# Patient Record
Sex: FEMALE | Race: WHITE | URBAN - METROPOLITAN AREA
[De-identification: names, ages, dates, MRNs, and addresses within clinical notes are randomized per-mention and may not be internally consistent; named-entity substitution may affect disease eponyms.]

---

## 2024-08-09 ENCOUNTER — EMPLOYEE HEALTH (OUTPATIENT)
Dept: OTHER | Facility: HOSPITAL | Age: 34
End: 2024-08-09
Attending: PREVENTIVE MEDICINE

## 2024-08-09 DIAGNOSIS — Z01.84 IMMUNITY STATUS TESTING: ICD-10-CM

## 2024-08-09 DIAGNOSIS — Z11.1 SCREENING-PULMONARY TB: Primary | ICD-10-CM

## 2024-08-09 PROCEDURE — 86787 VARICELLA-ZOSTER ANTIBODY: CPT

## 2024-08-09 PROCEDURE — 86480 TB TEST CELL IMMUN MEASURE: CPT

## 2024-08-12 LAB — VZV IGG SER IA-ACNC: 131.2 (ref 165–?)

## 2024-08-16 ENCOUNTER — LAB ENCOUNTER (OUTPATIENT)
Dept: LAB | Age: 34
End: 2024-08-16
Attending: PREVENTIVE MEDICINE

## 2025-01-11 ENCOUNTER — LAB ENCOUNTER (OUTPATIENT)
Dept: LAB | Age: 35
End: 2025-01-11
Attending: PREVENTIVE MEDICINE
Payer: COMMERCIAL

## 2025-01-11 DIAGNOSIS — Z11.1 SCREENING-PULMONARY TB: ICD-10-CM

## 2025-01-11 PROCEDURE — 86480 TB TEST CELL IMMUN MEASURE: CPT

## 2025-01-11 PROCEDURE — 36415 COLL VENOUS BLD VENIPUNCTURE: CPT

## 2025-01-14 LAB
M TB IFN-G CD4+ T-CELLS BLD-ACNC: 0.02 IU/ML
M TB TUBERC IFN-G BLD QL: NEGATIVE
M TB TUBERC IGNF/MITOGEN IGNF CONTROL: 5.47 IU/ML
QFT TB1 AG MINUS NIL: 0.01 IU/ML
QFT TB2 AG MINUS NIL: 0 IU/ML

## 2025-03-15 ENCOUNTER — OFFICE VISIT (OUTPATIENT)
Dept: FAMILY MEDICINE CLINIC | Facility: CLINIC | Age: 35
End: 2025-03-15
Payer: COMMERCIAL

## 2025-03-15 ENCOUNTER — LAB ENCOUNTER (OUTPATIENT)
Dept: LAB | Age: 35
End: 2025-03-15
Attending: STUDENT IN AN ORGANIZED HEALTH CARE EDUCATION/TRAINING PROGRAM
Payer: COMMERCIAL

## 2025-03-15 VITALS
BODY MASS INDEX: 26.53 KG/M2 | WEIGHT: 169 LBS | SYSTOLIC BLOOD PRESSURE: 110 MMHG | TEMPERATURE: 97 F | HEART RATE: 70 BPM | RESPIRATION RATE: 16 BRPM | HEIGHT: 67 IN | OXYGEN SATURATION: 99 % | DIASTOLIC BLOOD PRESSURE: 60 MMHG

## 2025-03-15 DIAGNOSIS — Z13.1 DIABETES MELLITUS SCREENING: ICD-10-CM

## 2025-03-15 DIAGNOSIS — Z13.220 SCREENING CHOLESTEROL LEVEL: ICD-10-CM

## 2025-03-15 DIAGNOSIS — Z86.32 HISTORY OF GESTATIONAL DIABETES: ICD-10-CM

## 2025-03-15 DIAGNOSIS — Z12.39 BREAST CANCER SCREENING OTHER THAN MAMMOGRAM: ICD-10-CM

## 2025-03-15 DIAGNOSIS — Z00.00 WELL ADULT EXAM: ICD-10-CM

## 2025-03-15 DIAGNOSIS — Z13.31 NEGATIVE DEPRESSION SCREENING: ICD-10-CM

## 2025-03-15 DIAGNOSIS — Z80.3 FAMILY HISTORY OF BREAST CANCER: ICD-10-CM

## 2025-03-15 DIAGNOSIS — Z12.31 BREAST CANCER SCREENING BY MAMMOGRAM: ICD-10-CM

## 2025-03-15 DIAGNOSIS — Z00.00 WELL ADULT EXAM: Primary | ICD-10-CM

## 2025-03-15 LAB
ALBUMIN SERPL-MCNC: 4.6 G/DL (ref 3.2–4.8)
ALBUMIN/GLOB SERPL: 1.5 {RATIO} (ref 1–2)
ALP LIVER SERPL-CCNC: 87 U/L
ALT SERPL-CCNC: 8 U/L
ANION GAP SERPL CALC-SCNC: 7 MMOL/L (ref 0–18)
AST SERPL-CCNC: 20 U/L (ref ?–34)
BASOPHILS # BLD AUTO: 0.06 X10(3) UL (ref 0–0.2)
BASOPHILS NFR BLD AUTO: 1.2 %
BILIRUB SERPL-MCNC: 0.4 MG/DL (ref 0.3–1.2)
BUN BLD-MCNC: 13 MG/DL (ref 9–23)
CALCIUM BLD-MCNC: 9.3 MG/DL (ref 8.7–10.6)
CHLORIDE SERPL-SCNC: 106 MMOL/L (ref 98–112)
CHOLEST SERPL-MCNC: 166 MG/DL (ref ?–200)
CO2 SERPL-SCNC: 27 MMOL/L (ref 21–32)
CREAT BLD-MCNC: 0.69 MG/DL
EGFRCR SERPLBLD CKD-EPI 2021: 115 ML/MIN/1.73M2 (ref 60–?)
EOSINOPHIL # BLD AUTO: 0.13 X10(3) UL (ref 0–0.7)
EOSINOPHIL NFR BLD AUTO: 2.6 %
ERYTHROCYTE [DISTWIDTH] IN BLOOD BY AUTOMATED COUNT: 12.9 %
EST. AVERAGE GLUCOSE BLD GHB EST-MCNC: 123 MG/DL (ref 68–126)
FASTING PATIENT LIPID ANSWER: YES
FASTING STATUS PATIENT QL REPORTED: YES
GLOBULIN PLAS-MCNC: 3.1 G/DL (ref 2–3.5)
GLUCOSE BLD-MCNC: 81 MG/DL (ref 70–99)
HBA1C MFR BLD: 5.9 % (ref ?–5.7)
HCT VFR BLD AUTO: 39.9 %
HDLC SERPL-MCNC: 56 MG/DL (ref 40–59)
HGB BLD-MCNC: 12.8 G/DL
IMM GRANULOCYTES # BLD AUTO: 0.01 X10(3) UL (ref 0–1)
IMM GRANULOCYTES NFR BLD: 0.2 %
LDLC SERPL CALC-MCNC: 96 MG/DL (ref ?–100)
LYMPHOCYTES # BLD AUTO: 1.51 X10(3) UL (ref 1–4)
LYMPHOCYTES NFR BLD AUTO: 30.2 %
MCH RBC QN AUTO: 28.4 PG (ref 26–34)
MCHC RBC AUTO-ENTMCNC: 32.1 G/DL (ref 31–37)
MCV RBC AUTO: 88.7 FL
MONOCYTES # BLD AUTO: 0.32 X10(3) UL (ref 0.1–1)
MONOCYTES NFR BLD AUTO: 6.4 %
NEUTROPHILS # BLD AUTO: 2.97 X10 (3) UL (ref 1.5–7.7)
NEUTROPHILS # BLD AUTO: 2.97 X10(3) UL (ref 1.5–7.7)
NEUTROPHILS NFR BLD AUTO: 59.4 %
NONHDLC SERPL-MCNC: 110 MG/DL (ref ?–130)
OSMOLALITY SERPL CALC.SUM OF ELEC: 289 MOSM/KG (ref 275–295)
PLATELET # BLD AUTO: 235 10(3)UL (ref 150–450)
POTASSIUM SERPL-SCNC: 4.6 MMOL/L (ref 3.5–5.1)
PROT SERPL-MCNC: 7.7 G/DL (ref 5.7–8.2)
RBC # BLD AUTO: 4.5 X10(6)UL
SODIUM SERPL-SCNC: 140 MMOL/L (ref 136–145)
TRIGL SERPL-MCNC: 71 MG/DL (ref 30–149)
TSI SER-ACNC: 0.85 UIU/ML (ref 0.55–4.78)
VLDLC SERPL CALC-MCNC: 12 MG/DL (ref 0–30)
WBC # BLD AUTO: 5 X10(3) UL (ref 4–11)

## 2025-03-15 PROCEDURE — 80061 LIPID PANEL: CPT

## 2025-03-15 PROCEDURE — 85025 COMPLETE CBC W/AUTO DIFF WBC: CPT

## 2025-03-15 PROCEDURE — 84443 ASSAY THYROID STIM HORMONE: CPT

## 2025-03-15 PROCEDURE — 36415 COLL VENOUS BLD VENIPUNCTURE: CPT

## 2025-03-15 PROCEDURE — 99385 PREV VISIT NEW AGE 18-39: CPT | Performed by: STUDENT IN AN ORGANIZED HEALTH CARE EDUCATION/TRAINING PROGRAM

## 2025-03-15 PROCEDURE — 80053 COMPREHEN METABOLIC PANEL: CPT

## 2025-03-15 PROCEDURE — 83036 HEMOGLOBIN GLYCOSYLATED A1C: CPT

## 2025-03-15 NOTE — PROGRESS NOTES
Subjective:      Chief Complaint   Patient presents with    Physical     Last pap done 2023 with Dr. Shetty in [facility].     HISTORY OF PRESENT ILLNESS  HPI  HPI obtained per patient report.  Anna West is a pleasant 35 year old female presenting for her annual physical.   She is overall feeling well.   She requests early breast cancer screening, as her mother and aunt have a history of breast cancer, and her mother was diagnosed at the young age of 38.     PAST PATIENT HISTORY  Past Medical History:    Gestational diabetes (HCC)     Past Surgical History:   Procedure Laterality Date          Cholecystectomy      Other surgical history Right     ankle suegery x2       CURRENT MEDICATIONS  Medications Taking[1]    HEALTH MAINTENANCE  Immunization History   Administered Date(s) Administered    Covid-19 Vaccine Pfizer 30 mcg/0.3 ml 2021, 2021    DTAP 1989, 1989, 1989, 1990, 1994    FLU VAC QIV SPLIT 3 YRS AND OLDER (38789) 12/10/2018, 2021    HEP B, Ped/Adol 1994, 1999, 1999    HIB (PRP-D) 1990    IPV 1989, 1989, 1990, 1994    Influenza 2019, 2020    Influenza Vaccine, trivalent (IIV3), PF 0.5mL (34814) 10/16/2024    MMR 1994, 1994    TDAP 2016    Td, Preserv Free 2003    Varicella 2024       ALLERGIES AND DRUG REACTIONS  Allergies[2]    Family History   Problem Relation Age of Onset    Anemia Mother     Asthma Mother     Bleeding Disorders Mother     Cancer Mother     Heart Disorder Mother     Hypertension Mother     Pulmonary Disease Mother     Musculo-skelatal Disorder Father         Has MS    Stroke Maternal Grandfather     Cancer Paternal Grandmother     Diabetes Paternal Grandmother     Heart Disorder Paternal Grandmother     Pulmonary Disease Paternal Grandmother      Social History     Socioeconomic History    Marital status:    Tobacco  Use    Smoking status: Never    Smokeless tobacco: Never   Vaping Use    Vaping status: Never Used   Substance and Sexual Activity    Alcohol use: Never    Drug use: Never   Other Topics Concern    Caffeine Concern No    Exercise Yes     Comment: Walking/running 2 x a week    Seat Belt Yes     Comment: Every time I get into a car    Special Diet No    Stress Concern No    Weight Concern No       Review of Systems   All other systems reviewed and are negative.         Objective:      /60   Pulse 70   Temp 97.4 °F (36.3 °C) (Temporal)   Resp 16   Ht 5' 7\" (1.702 m)   Wt 169 lb (76.7 kg)   LMP 02/19/2025 (Exact Date)   SpO2 99%   BMI 26.47 kg/m²   Body mass index is 26.47 kg/m².    Physical Exam  Vitals reviewed.   Constitutional:       General: She is not in acute distress.     Appearance: She is not ill-appearing, toxic-appearing or diaphoretic.   HENT:      Head: Normocephalic and atraumatic.   Eyes:      General: No scleral icterus.        Right eye: No discharge.         Left eye: No discharge.      Extraocular Movements: Extraocular movements intact.      Conjunctiva/sclera: Conjunctivae normal.   Neck:      Thyroid: No thyroid mass, thyromegaly or thyroid tenderness.   Cardiovascular:      Rate and Rhythm: Normal rate and regular rhythm.      Heart sounds: Normal heart sounds.   Pulmonary:      Effort: Pulmonary effort is normal.      Breath sounds: Normal breath sounds.   Abdominal:      General: Abdomen is flat. Bowel sounds are normal. There is no distension.      Palpations: Abdomen is soft. There is no mass.      Tenderness: There is no abdominal tenderness. There is no guarding or rebound.      Hernia: No hernia is present.   Musculoskeletal:      Cervical back: Neck supple. No tenderness.      Right lower leg: No edema.      Left lower leg: No edema.   Lymphadenopathy:      Cervical: No cervical adenopathy.   Neurological:      Mental Status: She is alert and oriented to person, place, and  time.   Psychiatric:         Mood and Affect: Mood normal.            Assessment and Plan:      1. Well adult exam (Primary)  -     CBC With Differential With Platelet; Future; Expected date: 03/15/2025  -     Comp Metabolic Panel (14); Future; Expected date: 03/15/2025  -     Hemoglobin A1C; Future; Expected date: 03/15/2025  -     Lipid Panel; Future; Expected date: 03/15/2025  -     TSH W Reflex To Free T4; Future; Expected date: 03/15/2025  2. Screening cholesterol level  -     Lipid Panel; Future; Expected date: 03/15/2025  3. Diabetes mellitus screening  -     Hemoglobin A1C; Future; Expected date: 03/15/2025  4. History of gestational diabetes  -     Hemoglobin A1C; Future; Expected date: 03/15/2025  5. Family history of breast cancer  -     JOHN JOSE DANIEL 2D+3D SCREENING BILAT (CPT=77067/13992); Future; Expected date: 03/15/2025  -     US BREAST BILATERAL COMPLETE (CPT=76641-50); Future; Expected date: 03/15/2025  6. Breast cancer screening by mammogram  -     JOHN JOSE DANIEL 2D+3D SCREENING BILAT (CPT=77067/38391); Future; Expected date: 03/15/2025  -     US BREAST BILATERAL COMPLETE (CPT=76641-50); Future; Expected date: 03/15/2025  7. Breast cancer screening other than mammogram  -     JOHN JOSE DANIEL 2D+3D SCREENING BILAT (CPT=77067/73633); Future; Expected date: 03/15/2025  -     US BREAST BILATERAL COMPLETE (CPT=76641-50); Future; Expected date: 03/15/2025  8. Negative depression screening    Return in about 1 year (around 3/15/2026) for physical.    - annual lab orders discussed and provided for her today  - will order screening mammogram and breast US for due to family history of early-onset breast cancer   - she is UTD on her immunizations  - will request pap smear records from her Gyne's office     Patient verbalized understanding of assessment and recommendations. All questions and concerns were addressed.    Electronically signed by Luan Jimenez MD         [1]   No outpatient medications have been marked as taking for  the 3/15/25 encounter (Office Visit) with Luan Jimenez MD.   [2]   Allergies  Allergen Reactions    Acetaminophen HIVES     acetaminophen

## 2025-03-20 NOTE — PROGRESS NOTES
Fredi Castillo,     Your lab results showed mildly elevated blood sugars but otherwise looked great. Please try to cut back on your intake of carbohydrates and exercise at least 20-30  minutes 5 days per week. I would recommend annual laboratory monitoring at this time.     Have a nice day,  Dr. Jimenez

## 2025-04-16 ENCOUNTER — HOSPITAL ENCOUNTER (OUTPATIENT)
Dept: MAMMOGRAPHY | Age: 35
Discharge: HOME | End: 2025-04-16
Attending: STUDENT IN AN ORGANIZED HEALTH CARE EDUCATION/TRAINING PROGRAM
Payer: COMMERCIAL

## 2025-04-16 DIAGNOSIS — Z80.3 FAMILY HISTORY OF BREAST CANCER: ICD-10-CM

## 2025-04-16 DIAGNOSIS — Z12.31 BREAST CANCER SCREENING BY MAMMOGRAM: ICD-10-CM

## 2025-04-16 DIAGNOSIS — Z12.39 BREAST CANCER SCREENING OTHER THAN MAMMOGRAM: ICD-10-CM

## 2025-04-16 PROCEDURE — 77067 SCR MAMMO BI INCL CAD: CPT | Performed by: STUDENT IN AN ORGANIZED HEALTH CARE EDUCATION/TRAINING PROGRAM

## 2025-04-16 PROCEDURE — 77063 BREAST TOMOSYNTHESIS BI: CPT | Performed by: STUDENT IN AN ORGANIZED HEALTH CARE EDUCATION/TRAINING PROGRAM

## 2025-04-17 NOTE — PROGRESS NOTES
Fredi Castillo,     Good news - your mammogram result was benign. We can reconsider another screening mammogram in 1 year. Given your family history, we can also consider genetic testing if your mom did not have BRCA testing. Please let me know if you would like to proceed with this and we could refer you to a genetic counselor. Please let me know if you have any questions.     Dr. Jimenez

## 2025-07-16 ENCOUNTER — HOSPITAL ENCOUNTER (EMERGENCY)
Age: 35
Discharge: HOME | End: 2025-07-16
Attending: EMERGENCY MEDICINE
Payer: COMMERCIAL

## 2025-07-16 ENCOUNTER — APPOINTMENT (OUTPATIENT)
Dept: GENERAL RADIOLOGY | Age: 35
End: 2025-07-16
Attending: EMERGENCY MEDICINE
Payer: COMMERCIAL

## 2025-07-16 ENCOUNTER — APPOINTMENT (OUTPATIENT)
Dept: CT IMAGING | Age: 35
End: 2025-07-16
Attending: EMERGENCY MEDICINE
Payer: COMMERCIAL

## 2025-07-16 ENCOUNTER — TELEPHONE (OUTPATIENT)
Dept: FAMILY MEDICINE CLINIC | Facility: CLINIC | Age: 35
End: 2025-07-16

## 2025-07-16 VITALS
WEIGHT: 167 LBS | RESPIRATION RATE: 18 BRPM | HEART RATE: 86 BPM | SYSTOLIC BLOOD PRESSURE: 106 MMHG | DIASTOLIC BLOOD PRESSURE: 77 MMHG | HEIGHT: 67 IN | TEMPERATURE: 99 F | BODY MASS INDEX: 26.21 KG/M2 | OXYGEN SATURATION: 99 %

## 2025-07-16 DIAGNOSIS — S16.1XXA STRAIN OF NECK MUSCLE, INITIAL ENCOUNTER: Primary | ICD-10-CM

## 2025-07-16 DIAGNOSIS — G56.92 NEUROPATHY, UPPER EXTREMITY, LEFT: ICD-10-CM

## 2025-07-16 LAB
ALBUMIN SERPL-MCNC: 4.7 G/DL (ref 3.2–4.8)
ALBUMIN/GLOB SERPL: 1.6 {RATIO} (ref 1–2)
ALP LIVER SERPL-CCNC: 88 U/L (ref 37–98)
ALT SERPL-CCNC: 14 U/L (ref 10–49)
ANION GAP SERPL CALC-SCNC: 6 MMOL/L (ref 0–18)
AST SERPL-CCNC: 23 U/L (ref ?–34)
B-HCG UR QL: NEGATIVE
BASOPHILS # BLD AUTO: 0.08 X10(3) UL (ref 0–0.2)
BASOPHILS NFR BLD AUTO: 1.3 %
BILIRUB SERPL-MCNC: 0.3 MG/DL (ref 0.3–1.2)
BUN BLD-MCNC: 12 MG/DL (ref 9–23)
CALCIUM BLD-MCNC: 9.6 MG/DL (ref 8.7–10.6)
CHLORIDE SERPL-SCNC: 105 MMOL/L (ref 98–112)
CO2 SERPL-SCNC: 28 MMOL/L (ref 21–32)
CREAT BLD-MCNC: 0.76 MG/DL (ref 0.55–1.02)
EGFRCR SERPLBLD CKD-EPI 2021: 104 ML/MIN/1.73M2 (ref 60–?)
EOSINOPHIL # BLD AUTO: 0.1 X10(3) UL (ref 0–0.7)
EOSINOPHIL NFR BLD AUTO: 1.6 %
ERYTHROCYTE [DISTWIDTH] IN BLOOD BY AUTOMATED COUNT: 13.2 %
GLOBULIN PLAS-MCNC: 3 G/DL (ref 2–3.5)
GLUCOSE BLD-MCNC: 107 MG/DL (ref 70–99)
HCT VFR BLD AUTO: 38.2 % (ref 35–48)
HGB BLD-MCNC: 12.3 G/DL (ref 12–16)
IMM GRANULOCYTES # BLD AUTO: 0.01 X10(3) UL (ref 0–1)
IMM GRANULOCYTES NFR BLD: 0.2 %
LYMPHOCYTES # BLD AUTO: 2.26 X10(3) UL (ref 1–4)
LYMPHOCYTES NFR BLD AUTO: 37 %
MCH RBC QN AUTO: 28.5 PG (ref 26–34)
MCHC RBC AUTO-ENTMCNC: 32.2 G/DL (ref 31–37)
MCV RBC AUTO: 88.4 FL (ref 80–100)
MONOCYTES # BLD AUTO: 0.53 X10(3) UL (ref 0.1–1)
MONOCYTES NFR BLD AUTO: 8.7 %
NEUTROPHILS # BLD AUTO: 3.13 X10 (3) UL (ref 1.5–7.7)
NEUTROPHILS # BLD AUTO: 3.13 X10(3) UL (ref 1.5–7.7)
NEUTROPHILS NFR BLD AUTO: 51.2 %
OSMOLALITY SERPL CALC.SUM OF ELEC: 288 MOSM/KG (ref 275–295)
PLATELET # BLD AUTO: 223 10(3)UL (ref 150–450)
POTASSIUM SERPL-SCNC: 4.2 MMOL/L (ref 3.5–5.1)
PROT SERPL-MCNC: 7.7 G/DL (ref 5.7–8.2)
RBC # BLD AUTO: 4.32 X10(6)UL (ref 3.8–5.3)
SODIUM SERPL-SCNC: 139 MMOL/L (ref 136–145)
TROPONIN I SERPL HS-MCNC: <3 NG/L (ref ?–34)
WBC # BLD AUTO: 6.1 X10(3) UL (ref 4–11)

## 2025-07-16 PROCEDURE — 85025 COMPLETE CBC W/AUTO DIFF WBC: CPT | Performed by: EMERGENCY MEDICINE

## 2025-07-16 PROCEDURE — 93010 ELECTROCARDIOGRAM REPORT: CPT

## 2025-07-16 PROCEDURE — 99285 EMERGENCY DEPT VISIT HI MDM: CPT

## 2025-07-16 PROCEDURE — 81025 URINE PREGNANCY TEST: CPT

## 2025-07-16 PROCEDURE — 72125 CT NECK SPINE W/O DYE: CPT | Performed by: EMERGENCY MEDICINE

## 2025-07-16 PROCEDURE — 84484 ASSAY OF TROPONIN QUANT: CPT | Performed by: EMERGENCY MEDICINE

## 2025-07-16 PROCEDURE — 93005 ELECTROCARDIOGRAM TRACING: CPT

## 2025-07-16 PROCEDURE — 71045 X-RAY EXAM CHEST 1 VIEW: CPT | Performed by: EMERGENCY MEDICINE

## 2025-07-16 PROCEDURE — 36415 COLL VENOUS BLD VENIPUNCTURE: CPT

## 2025-07-16 PROCEDURE — 80053 COMPREHEN METABOLIC PANEL: CPT | Performed by: EMERGENCY MEDICINE

## 2025-07-16 NOTE — DISCHARGE INSTRUCTIONS
Take 1000 mg acetaminophen (2 Tylenol tablets) and/or 600 mg ibuprofen (3 Advil tablets) every 6 hours as needed for pain/fever    Apply heating pad to the painful area of the neck

## 2025-07-16 NOTE — ED INITIAL ASSESSMENT (HPI)
Left arm and hand numbness and pressure that radiates to left chest, has progressively gotten worse since Saturday. No recent travel. No leg swelling, sob. No recent illness.

## 2025-07-16 NOTE — TELEPHONE ENCOUNTER
Future Appointments   Date Time Provider Department Center   8/6/2025  1:00 PM Luan Jimenez MD [facility] [address] [facility] [address]     Patient requesting a sooner appointment for her symptoms.

## 2025-07-16 NOTE — TELEPHONE ENCOUNTER
Spoke to patient. She woke up on Saturday with left pain and numbness in her arm and hand. Patient thought she slept in a wrong position. Denies any recent trauma. Denies any chest pain, shortness of breath, dizziness, lightheadedness, difficulty breathing. Smile is symmetrical. Patient speaking clearly and full sentences. No weakness, able to  objects.     Advised patient if symptoms worsen or she develops chest pain, shortness of breath, etc to seek immediate medical attention.     Patient is requesting a sooner appointment.     Please advise.     Future Appointments   Date Time Provider Department Center   8/6/2025  1:00 PM Luan Jimenez MD [facility] [address] [facility] [address]

## 2025-07-16 NOTE — ED PROVIDER NOTES
Patient Seen in: [facility] [place]      History     Stated Complaint: numbness down left arm and pain; radiates to back and upper left side of chest  History of Present Illness  Anna West is a 35 year old female who presents with numbness and tingling in her left arm and hand. She contacted her primary care provider earlier today, who mentioned that her symptoms could mimic a heart attack.    Numbness and tingling in the left arm and hand began on Saturday, affecting the entire hand and impairing her ability to  objects. Pain radiates from the back of her left shoulder down the arm, with shoulder pain starting today and arm pain beginning yesterday. The pain occurs spontaneously without pressure provocation. She does not take any prescribed medications but regularly takes vitamins. 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    [Note: Patient (or parent) aware that ambient AI utilized for transcribing the HPI.]  Objective:   Past Medical History:    Gestational diabetes (HCC)    Prediabetes              Past Surgical History:   Procedure Laterality Date          Cholecystectomy      Other surgical history Right     ankle suegery x2                Social History     Socioeconomic History    Marital status:    Tobacco Use    Smoking status: Never    Smokeless tobacco: Never   Vaping Use    Vaping status: Never Used   Substance and Sexual Activity    Alcohol use: Never    Drug use: Never   Other Topics Concern    Caffeine Concern No    Exercise Yes     Comment: Walking/running 2 x a week    Seat Belt Yes     Comment: Every time I get into a car    Special Diet No    Stress Concern No    Weight Concern No              Review of Systems    Positive for stated complaint: numbness down left arm and pain; radiates to back and upper left side of chest  Other systems are as noted in HPI.  Constitutional and  vital signs reviewed.      All other systems reviewed and negative except as noted above.    Physical Exam     ED Triage Vitals [07/16/25 1715]   /77   Pulse 82   Resp 20   Temp 99 °F (37.2 °C)   Temp src Oral   SpO2 100 %   O2 Device None (Room air)       Current:/77   Pulse 86   Temp 99 °F (37.2 °C) (Oral)   Resp 18   Ht 170.2 cm (5' 7\")   Wt 75.8 kg   LMP 07/13/2025 (Exact Date)   SpO2 99%   BMI 26.16 kg/m²     General:  Vitals as listed.  No acute distress   HEENT: Sclerae anicteric.  Conjunctivae show no pallor.  Oropharynx clear, mucous membranes moist  Neck: Nontender  Lungs: good air exchange  Extremities: no edema, normal peripheral pulses.  No palpable tenderness of the left shoulder.  Neuro: Alert oriented and nonfocal.  Normal radial/median/ulnar motor/sensory exam of the upper extremities      ED Course     Labs Reviewed   COMP METABOLIC PANEL (14) - Abnormal; Notable for the following components:       Result Value    Glucose 107 (*)     All other components within normal limits   TROPONIN I HIGH SENSITIVITY - Normal   POCT PREGNANCY URINE - Normal   CBC WITH DIFFERENTIAL WITH PLATELET   SCAN SLIDE   RAINBOW DRAW BLUE     CT SPINE CERVICAL (CPT=72125)  Result Date: 7/16/2025  CONCLUSION: 1.  No cervical spine pathology. Consider MRI for further evaluation, as clinically directed. 2.  Partial calcification of both stylohyoid ligaments. Possibility of Eagle syndrome is considered, correlate clinically.  Electronically Verified and Signed by Attending Radiologist: Merrick Thomas MD 7/16/2025 6:21 PM Workstation: [id number]    XR CHEST AP PORTABLE  (CPT=71045)  Result Date: 7/16/2025  CONCLUSION: There is no evidence of active cardiopulmonary disease on this single portable chest radiograph. Electronically Verified and Signed by Attending Radiologist: Tong Still MD 7/16/2025 6:05 PM Workstation: [id number]    I independently read the radiographs and no pulmonary edema on chest  x-ray  EKG    Rate, intervals and axes as noted on EKG Report.  Rate: 79  Rhythm: Sinus Rhythm  Reading: No acute ST-T changes           ED COURSE and MDM     Sources of the medical history included the patient and her     Differential diagnosis before testing included neuropathy versus herniated disc versus acute CVA, a medical condition that poses a threat to life.    I reviewed prior external notes including evaluation by family practitioner, Dr. Jimenez, on 3/15/2025 for annual physical.    Use Tylenol/ibuprofen for discomfort and follow-up with primary care    I have discussed with the patient the results of testing, differential diagnosis, and treatment plan. They expressed clear understanding of these instructions and agrees to the plan provided.    Disposition and Plan     Clinical Impression:  1. Strain of neck muscle, initial encounter    2. Neuropathy, upper extremity, left         Disposition:  Discharge  7/16/2025  6:35 pm    Follow-up:  Luan Jimenez MD  [id number] [address] [address]  [phone]    Schedule an appointment as soon as possible for a visit in 1 week(s)  As needed        Medications Prescribed:  There are no discharge medications for this patient.

## 2025-07-17 LAB
ATRIAL RATE: 79 BPM
P AXIS: 22 DEGREES
P-R INTERVAL: 126 MS
Q-T INTERVAL: 352 MS
QRS DURATION: 68 MS
QTC CALCULATION (BEZET): 403 MS
R AXIS: 52 DEGREES
T AXIS: 37 DEGREES
VENTRICULAR RATE: 79 BPM

## 2025-08-06 ENCOUNTER — OFFICE VISIT (OUTPATIENT)
Dept: FAMILY MEDICINE CLINIC | Facility: CLINIC | Age: 35
End: 2025-08-06

## 2025-08-06 VITALS
HEART RATE: 74 BPM | DIASTOLIC BLOOD PRESSURE: 70 MMHG | WEIGHT: 173.13 LBS | BODY MASS INDEX: 27.17 KG/M2 | SYSTOLIC BLOOD PRESSURE: 110 MMHG | RESPIRATION RATE: 16 BRPM | TEMPERATURE: 97 F | HEIGHT: 67 IN | OXYGEN SATURATION: 98 %

## 2025-08-06 DIAGNOSIS — R20.0 LEFT ARM NUMBNESS: ICD-10-CM

## 2025-08-06 DIAGNOSIS — M62.838 TRAPEZIUS MUSCLE SPASM: ICD-10-CM

## 2025-08-06 DIAGNOSIS — Z82.0 FAMILY HISTORY OF MS (MULTIPLE SCLEROSIS): Primary | ICD-10-CM

## 2025-08-06 PROCEDURE — 99214 OFFICE O/P EST MOD 30 MIN: CPT | Performed by: STUDENT IN AN ORGANIZED HEALTH CARE EDUCATION/TRAINING PROGRAM

## 2025-08-06 RX ORDER — CYCLOBENZAPRINE HCL 5 MG
5 TABLET ORAL NIGHTLY
Qty: 7 TABLET | Refills: 0 | Status: SHIPPED | OUTPATIENT
Start: 2025-08-06 | End: 2025-08-13

## 2025-08-06 RX ORDER — CHOLECALCIFEROL (VITAMIN D3) 25 MCG
1000 TABLET ORAL DAILY
COMMUNITY

## 2025-08-06 RX ORDER — MULTIVIT WITH MINERALS/LUTEIN
250 TABLET ORAL DAILY
COMMUNITY

## 2025-08-06 RX ORDER — MULTIVIT-MIN/IRON FUM/FOLIC AC 7.5 MG-4
1 TABLET ORAL DAILY
COMMUNITY